# Patient Record
(demographics unavailable — no encounter records)

---

## 2024-11-20 NOTE — REASON FOR VISIT
[Home] : at home, [unfilled] , at the time of the visit. [Medical Office: ([facility])___] : at the medical office located in  [Subsequent Evaluation] : a subsequent evaluation for [FreeTextEntry2] : recurrent AOM

## 2024-11-20 NOTE — ASSESSMENT
[FreeTextEntry1] : Ear discomfort reported following recent upper respiratory tract infection.  Patient is planning to fly home from college in the next few days.  We discussed management options.  I have recommended oral antibiotics and steroids.  I have also recommended using decongestant nasal spray prior to air travel.  Follow-up recommended upon her return to the area.  She agrees with this plan.

## 2024-11-20 NOTE — HISTORY OF PRESENT ILLNESS
[de-identified] :  NAIN BOWSER has a history of bilateral ventilation tubes as a child, subsequently removed.. Developed ear pain and otorrhea on air travel several years ago. Persistent left TM perforation noted. Having some difficulty hearing in daily activities. Left ear TM repair in or about 2023 with synthetic material. Several ear infections in the both ears in the past 1 year; possibly with water contamination and with URI 7/26/2024: Bilateral eustachian tube balloon dilation, right myringoplasty, left tympanoplasty   [FreeTextEntry1] : 11/20/2024 Patient has developed left ear fullness and blocked feeling for the past few days following an upper respiratory tract infection.  There is no otorrhea reported.

## 2024-11-25 NOTE — PHYSICAL EXAM
[Normal] : temporomandibular joint is normal [FreeTextEntry1] : Microscopic ear exam with foreign body removal and debridement:  Right ear: The ear canal was patent and nonobstructed.  The tympanic membrane was intact and noninflamed.  Left ear: completily Obstructing cerumen and fungal debris was removed from the ear canal using suction, and curet.  A culture was taken prior to debridement.  Ear canal inflammation identified.  Tympanic membrane perforation with acute inflammation identified.  This was debrided and treated with topical CSF powder.

## 2024-11-25 NOTE — HISTORY OF PRESENT ILLNESS
[de-identified] :  NAIN BOWSER has a history of bilateral ventilation tubes as a child, subsequently removed.. Developed ear pain and otorrhea on air travel several years ago. Persistent left TM perforation noted. Having some difficulty hearing in daily activities. Left ear TM repair in or about 2023 with synthetic material. Several ear infections in the both ears in the past 1 year; possibly with water contamination and with URI 7/26/2024: Bilateral eustachian tube balloon dilation, right myringoplasty, left tympanoplasty   [FreeTextEntry1] : 11/25/2024 Developed left ear fullness about a week ago. Had taken oral antibiotics and steroids with no improvement. She had gone to the ER yesterday and had otic drops and ear cleaning. She had a CT scan which was available for review. She had an audiogram from October. She feels better today.

## 2024-11-25 NOTE — ASSESSMENT
[FreeTextEntry1] : Recurrent and painful left ear infection which includes fungal appearance.  This has negatively affected her life at college and caused significant pain on recenat air travel.  She has visited urgent care and emergency rooms several times. A culture was taken.  Strict dry ear precautions recommended.  I have recommended continued use of oral antibiotics.  I have placed her on alternating Ciprodex and voriconazole powder for self application.  I have instructed her in its use.  Clinical monitoring recommended.  She is scheduled to study abroad in [place] in the near future.  I have recommended follow-up with otologist colleagues in [facility] as needed.  The patient was noted to fail a depression screening questionnaire.  This was reviewed with the patient and her mother.  Resources were provided for follow-up if desired.  Time based billing: This encounter required more than 40 minutes of time excluding procedures.

## 2024-11-25 NOTE — REASON FOR VISIT
[Subsequent Evaluation] : a subsequent evaluation for [Ear Drainage] : ear drainage [Ear Pain] : ear pain [FreeTextEntry2] : recurrent AOM.

## 2025-01-06 NOTE — ASSESSMENT
[FreeTextEntry1] : Persistent left tympanic membrane perforation following tympanoplasty surgery in the past and subsequent fungal otitis.  Management options carefully reviewed.  Strict water protection recommended.  The patient is encouraged to proceed with further surgical planning for tympanic membrane repair.  We discussed this including realistic expectations for success.  Management options including nonsurgical options were reviewed.  She wished to proceed with surgical planning after she returns from study abroad program.  Surgical plan: Left tympanoplasty, cartilage graft.  (Left tragal cartilage remains present).  Time based billing: This encounter required more than 30 minutes of time excluding procedures.

## 2025-01-06 NOTE — PHYSICAL EXAM
[Normal] : temporomandibular joint is normal [FreeTextEntry1] : Microscopic ear exam with foreign body removal and debridement:  Right ear: The ear canal was patent and nonobstructed.  The tympanic membrane was intact and noninflamed.  Cartilage graft in place.  Left ear: Anterior central tympanic membrane perforation, 15%.  No associated inflammation.  Pinpoint perforation noted anteriorly.

## 2025-01-06 NOTE — HISTORY OF PRESENT ILLNESS
[de-identified] :  NAIN BOWSER has a history of bilateral ventilation tubes as a child, subsequently removed.. Developed ear pain and otorrhea on air travel several years ago. Persistent left TM perforation noted. Having some difficulty hearing in daily activities. Left ear TM repair in or about 2023 with synthetic material. Several ear infections in the both ears in the past 1 year; possibly with water contamination and with URI 7/26/2024: Bilateral eustachian tube balloon dilation, right myringoplasty, left tympanoplasty   [FreeTextEntry1] : January 6, 2025 No reported ear pain.  No otorrhea.  Hearing loss remains in the left ear.  This has interfered with her daily activities as a college student.

## 2025-01-06 NOTE — DATA REVIEWED
[de-identified] : Audiometry from October 2024 provided and reviewed.  Moderate conductive hearing loss is present in the left ear
BLE = 3+/5

## 2025-05-28 NOTE — HISTORY OF PRESENT ILLNESS
[de-identified] :  NAIN BOWSER has a history of bilateral ventilation tubes as a child, subsequently removed.. Developed ear pain and otorrhea on air travel several years ago. Persistent left TM perforation noted. Having some difficulty hearing in daily activities. Left ear TM repair in or about 2023 with synthetic material. 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  She reports several episodes of infection in the left ear which included otorrhea.  She feels that it was prompted by a flulike illness on 1 occasion and water contamination on the others.  Currently no pain or otorrhea.  There is a hearing loss present in the left ear which has interfered with her college life.  She is using a hearing aid at times.

## 2025-05-28 NOTE — DATA REVIEWED
[de-identified] : In light of the patients current symptoms, Complete audiometry was ordered and completed today. I have interpreted these results and reviewed them in detail with the patient.  Conductive hearing loss left ear

## 2025-05-28 NOTE — PHYSICAL EXAM
[Normal] : temporomandibular joint is normal [FreeTextEntry1] : Microscopic ear exam with cerumen debridement:  Right ear: Mild to moderate mucopurulence debrided from the medial ear canal in continuity with the repaired cartilage graft.  The tympanic membrane is intact with minimal surface inflammation.    Left ear: The ear canal was patent and nonobstructed.  Central tympanic membrane perforation 30% without associated inflammation.  A small marginal perforation appears present anteriorly as well.  No inflammation.

## 2025-05-28 NOTE — ASSESSMENT
[FreeTextEntry1] : Persistent perforation in the left ear despite 2 prior tympanoplasty surgeries.  Management options were reviewed with the patient and her mother.  She wishes to proceed with surgical planning for the left ear.  Realistic expectations discussed.  The need for strict water protection in the future is indicated.  I have also recommended the use of otic drops in the right ear due to associated mild inflammation at the repair site.  Surgical plan: Left tympanoplasty, cartilage graft (tragal cartilage intact),  Time based billing: This encounter required more than 30 minutes of time excluding procedures.

## 2025-06-09 NOTE — HISTORY OF PRESENT ILLNESS
[de-identified] : NAIN BOWSER has a history of bilateral ventilation tubes as a child, subsequently removed.. Developed ear pain and otorrhea on air travel several years ago. Persistent left TM perforation noted. Having some difficulty hearing in daily activities. Left ear TM repair in or about 2023 with synthetic material. Several ear infections in the both ears in the past 1 year; possibly with water contamination and with URI 7/26/2024: Bilateral eustachian tube balloon dilation, right myringoplasty, left tympanoplasty  June 4, 2025 - Left tympanoplasty, cartilage graft [FreeTextEntry1] : 06/09/2025 History Post operative visit. Reports mild pain.  No significant tinnitus.  No significant vertigo.  No bleeding reported. compliant with wound care  Physical Exam  Face: Normal Function bilaterally  Oral: Normal  Neck: No mass, Full range of motion   Microscopic Ear Exam Left Ear:  Tragal incision intact, not inflammed Clean gelfoam fills the ear canal. No significant inflammation noted.    [place] Hearing Assessment 512 Hz: Treadwell test: referred to the left ear  Postoperative instructions reviewed with the patient in detail. Followup plans discussed.

## 2025-07-16 NOTE — HISTORY OF PRESENT ILLNESS
[de-identified] :  NANI BOWSER has a history of bilateral ventilation tubes as a child, subsequently removed.. Developed ear pain and otorrhea on air travel several years ago. Persistent left TM perforation noted. Having some difficulty hearing in daily activities. Left ear TM repair in or about 2023 with synthetic material. Several ear infections in the both ears in the past 1 year; possibly with water contamination and with URI 7/26/2024: Bilateral eustachian tube balloon dilation, right myringoplasty, left tympanoplasty June 4, 2025 - Left tympanoplasty, cartilage graft   [FreeTextEntry1] : 07/16/2025 History Post operative visit. Reports No pain or otorrhea. Compliant with wound care  Physical Exam  Microscopic Ear Exam Left Ear:  Residual clean gelfoam partially debrided.  No significant inflammation noted.  The tympanic membrane appears intact.  Postoperative instructions reviewed with the patient in detail. Followup plans discussed.